# Patient Record
Sex: FEMALE | Race: WHITE | NOT HISPANIC OR LATINO | Employment: UNEMPLOYED | URBAN - METROPOLITAN AREA
[De-identification: names, ages, dates, MRNs, and addresses within clinical notes are randomized per-mention and may not be internally consistent; named-entity substitution may affect disease eponyms.]

---

## 2018-09-22 ENCOUNTER — HOSPITAL ENCOUNTER (EMERGENCY)
Facility: HOSPITAL | Age: 38
Discharge: HOME | End: 2018-09-22
Attending: EMERGENCY MEDICINE
Payer: MEDICAID

## 2018-09-22 VITALS
SYSTOLIC BLOOD PRESSURE: 120 MMHG | BODY MASS INDEX: 21.19 KG/M2 | DIASTOLIC BLOOD PRESSURE: 69 MMHG | TEMPERATURE: 98 F | HEIGHT: 67 IN | OXYGEN SATURATION: 97 % | HEART RATE: 86 BPM | WEIGHT: 135 LBS | RESPIRATION RATE: 20 BRPM

## 2018-09-22 DIAGNOSIS — R10.9 ABDOMINAL PAIN: ICD-10-CM

## 2018-09-22 DIAGNOSIS — R07.81 RIB PAIN ON RIGHT SIDE: Primary | ICD-10-CM

## 2018-09-22 LAB
ALBUMIN SERPL BCP-MCNC: 3.4 G/DL
ALP SERPL-CCNC: 101 U/L
ALT SERPL W/O P-5'-P-CCNC: 25 U/L
ANION GAP SERPL CALC-SCNC: 8 MMOL/L
AST SERPL-CCNC: 20 U/L
B-HCG UR QL: NEGATIVE
BASOPHILS # BLD AUTO: 0.01 K/UL
BASOPHILS NFR BLD: 0.2 %
BILIRUB SERPL-MCNC: 0.4 MG/DL
BILIRUB UR QL STRIP: NEGATIVE
BUN SERPL-MCNC: 15 MG/DL
CALCIUM SERPL-MCNC: 9.4 MG/DL
CHLORIDE SERPL-SCNC: 101 MMOL/L
CLARITY UR: ABNORMAL
CO2 SERPL-SCNC: 30 MMOL/L
COLOR UR: YELLOW
CREAT SERPL-MCNC: 0.7 MG/DL
CTP QC/QA: YES
DIFFERENTIAL METHOD: ABNORMAL
EOSINOPHIL # BLD AUTO: 0.2 K/UL
EOSINOPHIL NFR BLD: 3.4 %
ERYTHROCYTE [DISTWIDTH] IN BLOOD BY AUTOMATED COUNT: 12.5 %
EST. GFR  (AFRICAN AMERICAN): >60 ML/MIN/1.73 M^2
EST. GFR  (NON AFRICAN AMERICAN): >60 ML/MIN/1.73 M^2
GLUCOSE SERPL-MCNC: 94 MG/DL
GLUCOSE UR QL STRIP: NEGATIVE
HCT VFR BLD AUTO: 36.3 %
HGB BLD-MCNC: 12 G/DL
HGB UR QL STRIP: NEGATIVE
KETONES UR QL STRIP: NEGATIVE
LEUKOCYTE ESTERASE UR QL STRIP: NEGATIVE
LIPASE SERPL-CCNC: 6 U/L
LYMPHOCYTES # BLD AUTO: 1.3 K/UL
LYMPHOCYTES NFR BLD: 21.7 %
MCH RBC QN AUTO: 29.5 PG
MCHC RBC AUTO-ENTMCNC: 33.1 G/DL
MCV RBC AUTO: 89 FL
MONOCYTES # BLD AUTO: 0.6 K/UL
MONOCYTES NFR BLD: 10.3 %
NEUTROPHILS # BLD AUTO: 3.8 K/UL
NEUTROPHILS NFR BLD: 64.4 %
NITRITE UR QL STRIP: NEGATIVE
PH UR STRIP: 8 [PH] (ref 5–8)
PLATELET # BLD AUTO: 227 K/UL
PMV BLD AUTO: 10 FL
POTASSIUM SERPL-SCNC: 3.5 MMOL/L
PROT SERPL-MCNC: 7.2 G/DL
PROT UR QL STRIP: NEGATIVE
RBC # BLD AUTO: 4.07 M/UL
SODIUM SERPL-SCNC: 139 MMOL/L
SP GR UR STRIP: 1.01 (ref 1–1.03)
URN SPEC COLLECT METH UR: ABNORMAL
UROBILINOGEN UR STRIP-ACNC: ABNORMAL EU/DL
WBC # BLD AUTO: 5.9 K/UL

## 2018-09-22 PROCEDURE — 63600175 PHARM REV CODE 636 W HCPCS: Performed by: EMERGENCY MEDICINE

## 2018-09-22 PROCEDURE — 81003 URINALYSIS AUTO W/O SCOPE: CPT

## 2018-09-22 PROCEDURE — 93005 ELECTROCARDIOGRAM TRACING: CPT

## 2018-09-22 PROCEDURE — 81025 URINE PREGNANCY TEST: CPT | Performed by: EMERGENCY MEDICINE

## 2018-09-22 PROCEDURE — 83690 ASSAY OF LIPASE: CPT

## 2018-09-22 PROCEDURE — 85025 COMPLETE CBC W/AUTO DIFF WBC: CPT

## 2018-09-22 PROCEDURE — 99284 EMERGENCY DEPT VISIT MOD MDM: CPT | Mod: 25

## 2018-09-22 PROCEDURE — 80053 COMPREHEN METABOLIC PANEL: CPT

## 2018-09-22 PROCEDURE — 96372 THER/PROPH/DIAG INJ SC/IM: CPT

## 2018-09-22 RX ORDER — ACETAMINOPHEN 500 MG
500 TABLET ORAL EVERY 6 HOURS PRN
Qty: 25 TABLET | Refills: 0 | Status: SHIPPED | OUTPATIENT
Start: 2018-09-22 | End: 2020-02-19

## 2018-09-22 RX ORDER — KETOROLAC TROMETHAMINE 30 MG/ML
30 INJECTION, SOLUTION INTRAMUSCULAR; INTRAVENOUS
Status: COMPLETED | OUTPATIENT
Start: 2018-09-22 | End: 2018-09-22

## 2018-09-22 RX ORDER — IBUPROFEN 600 MG/1
600 TABLET ORAL EVERY 6 HOURS PRN
Qty: 25 TABLET | Refills: 0 | Status: SHIPPED | OUTPATIENT
Start: 2018-09-22 | End: 2020-02-19

## 2018-09-22 RX ORDER — KETOROLAC TROMETHAMINE 30 MG/ML
15 INJECTION, SOLUTION INTRAMUSCULAR; INTRAVENOUS
Status: DISCONTINUED | OUTPATIENT
Start: 2018-09-22 | End: 2018-09-22

## 2018-09-22 RX ADMIN — KETOROLAC TROMETHAMINE 30 MG: 30 INJECTION, SOLUTION INTRAMUSCULAR at 02:09

## 2018-09-22 NOTE — ED NOTES
Physician at bedside. Pt. Reports rt. Lower anterior rib pain with increasing sensitivity to palpation and pain with respirations x5 days. Pain radiates around to mid back area and describes as dull, aching. Pt. Has had cough and mild uri symptoms. Denies n/v.. Appetite wnl. Denies trauma or known injury. Pain is worse with lying down.

## 2018-09-22 NOTE — ED NOTES
Pt. Is sleeping without distress and is tolerating water well. md at bedside for reassessment and to discuss test results and discharge instructions

## 2018-09-22 NOTE — ED PROVIDER NOTES
Encounter Date: 9/22/2018    SCRIBE #1 NOTE: I, Abisai Holcomb, am scribing for, and in the presence of,  Dr. Milo Morales. I have scribed the entire note.       History     Chief Complaint   Patient presents with    Abdominal Pain     Patient reports of having right sided abdominal and rib pain x 5 days. Denies any nausea, vomiting, or diarrhea.     Rib Pain     Char Camargo is a 38 y.o. female who  has a past medical history of ADHD (attention deficit hyperactivity disorder), Cervical dysplasia, and Sinusitis.    The patient presents to the ED due to right sided abd pain that began more than 5 days ago. Patient reports she is unsure if she pulled a muscle but notes that it has been worsening. She endorses to right rib cage swelling, spasm, and hardness. She reports a stabbing pain that radiates to her back which she describes as aching and is aggravated with palpation and deep breathing. No reported alleviating factors. She admits to cough and runny nose but denies any nausea, vomiting, chest pain, SOB, diarrhea, black/bloody stool, or dysuria. She denies history of blood clots, hormone therapy, or recent traveling.       The history is provided by the patient.     Review of patient's allergies indicates:  No Known Allergies  Past Medical History:   Diagnosis Date    ADHD (attention deficit hyperactivity disorder)     Cervical dysplasia     Sinusitis      No past surgical history on file.  Family History   Problem Relation Age of Onset    Hypertension Mother     Skin cancer Father     Depression Father     Diabetes Father     Colon cancer Maternal Uncle     Breast cancer Paternal Grandmother     Breast cancer Maternal Aunt      Social History     Tobacco Use    Smoking status: Current Every Day Smoker     Packs/day: 0.50   Substance Use Topics    Alcohol use: No    Drug use: No     Review of Systems   Constitutional: Negative for chills and fever.   HENT: Positive for rhinorrhea. Negative for  congestion and sore throat.    Eyes: Negative for redness and visual disturbance.   Respiratory: Positive for cough. Negative for shortness of breath and wheezing.    Cardiovascular: Negative for chest pain and palpitations.   Gastrointestinal: Positive for abdominal pain. Negative for blood in stool, diarrhea, nausea and vomiting.   Genitourinary: Negative for dysuria and hematuria.   Musculoskeletal: Negative for back pain, myalgias and neck pain.   Skin: Negative for rash.   Neurological: Negative for dizziness, weakness and light-headedness.   Psychiatric/Behavioral: Negative for confusion.   All other systems reviewed and are negative.      Physical Exam     Initial Vitals [09/22/18 0106]   BP Pulse Resp Temp SpO2   (!) 153/98 101 20 98.4 °F (36.9 °C) 98 %      MAP       --         Physical Exam    Nursing note and vitals reviewed.  Constitutional: She appears well-developed and well-nourished. No distress.   HENT:   Head: Normocephalic and atraumatic.   Mouth/Throat: Oropharynx is clear and moist.   Eyes: Conjunctivae and EOM are normal. Pupils are equal, round, and reactive to light.   Neck: Normal range of motion. Neck supple.   Cardiovascular: Normal rate, regular rhythm, normal heart sounds and intact distal pulses.   Pulmonary/Chest: Breath sounds normal. No respiratory distress. She has no wheezes.   Abdominal: Soft. She exhibits no distension. There is tenderness.   RUQ tenderness to palpation.   Slight fullness along right upper rib cage.   Musculoskeletal: Normal range of motion. She exhibits no edema or tenderness.   Track marks noted to upper extremity.  Pelvis stable.   Neurological: She is alert and oriented to person, place, and time. She has normal strength. No cranial nerve deficit. GCS score is 15. GCS eye subscore is 4. GCS verbal subscore is 5. GCS motor subscore is 6.   Skin: Skin is warm and dry.         ED Course   Procedures  Labs Reviewed   CBC W/ AUTO DIFFERENTIAL - Abnormal; Notable  for the following components:       Result Value    Hematocrit 36.3 (*)     All other components within normal limits   COMPREHENSIVE METABOLIC PANEL - Abnormal; Notable for the following components:    CO2 30 (*)     Albumin 3.4 (*)     All other components within normal limits   URINALYSIS, REFLEX TO URINE CULTURE - Abnormal; Notable for the following components:    Appearance, UA Hazy (*)     Urobilinogen, UA 4.0-6.0 (*)     All other components within normal limits    Narrative:     Preferred Collection Type->Urine, Clean Catch   LIPASE   POCT URINE PREGNANCY     EKG Readings: (Independently Interpreted)   EKG: Rate: 93 bpm. Normal sinus rhythm. Negative QRS in lead 3. No ST changes.       Imaging Results          US Abdomen Limited (In process)                X-Ray Chest PA And Lateral (Final result)  Result time 09/22/18 02:05:28    Final result by Gregory Potter MD (09/22/18 02:05:28)                 Impression:      No acute process.      Electronically signed by: Gregory Potter MD  Date:    09/22/2018  Time:    02:05             Narrative:    EXAMINATION:  XR CHEST PA AND LATERAL    CLINICAL HISTORY:  Unspecified abdominal pain    TECHNIQUE:  PA and lateral views of the chest were performed.    COMPARISON:  None    FINDINGS:  The trachea is unremarkable.  The cardiomediastinal silhouette is within normal limits.  The hemidiaphragms are unremarkable.  There is no evidence of free air beneath the hemidiaphragms.  There are no pleural effusions.  There is no evidence of a pneumothorax.  There is no evidence of pneumomediastinum.  No airspace opacities are present.  The osseous structures are unremarkable.  The subcutaneous tissues are within normal limits.  There is large colonic stool burden.                                 Medical Decision Making:   Differential Diagnosis:   Differential Diagnosis includes, but is not limited to:  AAA, aortic dissection, mesenteric ischemia, perforated viscous, MI/ACS,  SBO/volvulus, incarcerated/strangulated hernia, intussusception, ileus, appendicitis, cholecystitis, cholangitis, diverticulitis, esophagitis, hepatitis, nephrolithiasis, pancreatitis, gastroenteritis, colitis, IBD/IBS, biliary colic, GERD, PUD, constipation, UTI/pyelonephritis,  disorder.    Clinical Tests:   Lab Tests: Ordered and Reviewed  Radiological Study: Ordered and Reviewed  Medical Tests: Ordered and Reviewed  ED Management:  2:47 AM   Patient was reassessed and in no apparent distress sleeping comfortably discussed with patient results of workup instructed to return with worsening of pain, fever, diarrhea, vomiting, or any other concern.  Chest x-ray negative for fracture ultrasound negative for acute cholecystitis urine without hematuria notable for elevated urine bilirubin however CMP unremarkable CBC also unremarkable will discharge patient with symptomatic care suspect possible muscle spasm    After complete evaluation, including thorough history and physical exam, the patient's symptoms are most consistent with benign cause of abdominal pain. There is no rebound/guarding or other peritoneal signs to suggest perforation or other emergent surgical process. There is no fever or leukocytosis to suggest acute bacterial infection. There is no significant focal abdominal tenderness to suggest cholecystitis, appendicitis, diverticulitis, or  source, and the patient's current symptoms and clinical presentation do not warrant other targeted diagnostics at this time. CT A/P are unlikely to outweigh risks of contrast/radiation at this time. The patient was treated with supportive care  and improved. Will provide RX for acetaminophen/ibuprofen upon D/C.                        Clinical Impression:     1. Abdominal pain    2. Abdominal pain            Disposition:   Disposition: Discharged  Condition: Stable       I, Milo Morales,  personally performed the services described in this documentation. All medical  record entries made by the scribe were at my direction and in my presence.  I have reviewed the chart and agree that the record reflects my personal performance and is accurate and complete. Milo Morales M.D. 3:17 AM09/22/2018                 Milo Morales Jr., MD  09/22/18 0317

## 2020-02-19 ENCOUNTER — OFFICE VISIT (OUTPATIENT)
Dept: OBSTETRICS AND GYNECOLOGY | Facility: CLINIC | Age: 40
End: 2020-02-19
Payer: MEDICAID

## 2020-02-19 ENCOUNTER — LAB VISIT (OUTPATIENT)
Dept: LAB | Facility: HOSPITAL | Age: 40
End: 2020-02-19
Attending: FAMILY MEDICINE
Payer: MEDICAID

## 2020-02-19 VITALS
BODY MASS INDEX: 23.7 KG/M2 | WEIGHT: 151 LBS | HEIGHT: 67 IN | SYSTOLIC BLOOD PRESSURE: 110 MMHG | DIASTOLIC BLOOD PRESSURE: 82 MMHG

## 2020-02-19 DIAGNOSIS — Z32.01 POSITIVE PREGNANCY TEST: Primary | ICD-10-CM

## 2020-02-19 DIAGNOSIS — Z32.01 POSITIVE PREGNANCY TEST: ICD-10-CM

## 2020-02-19 LAB — HCG INTACT+B SERPL-ACNC: 703 MIU/ML

## 2020-02-19 PROCEDURE — 99203 PR OFFICE/OUTPT VISIT, NEW, LEVL III, 30-44 MIN: ICD-10-PCS | Mod: TH,S$PBB,, | Performed by: MIDWIFE

## 2020-02-19 PROCEDURE — 84702 CHORIONIC GONADOTROPIN TEST: CPT

## 2020-02-19 PROCEDURE — 99999 PR PBB SHADOW E&M-EST. PATIENT-LVL II: ICD-10-PCS | Mod: PBBFAC,,, | Performed by: MIDWIFE

## 2020-02-19 PROCEDURE — 99203 OFFICE O/P NEW LOW 30 MIN: CPT | Mod: TH,S$PBB,, | Performed by: MIDWIFE

## 2020-02-19 PROCEDURE — 99999 PR PBB SHADOW E&M-EST. PATIENT-LVL II: CPT | Mod: PBBFAC,,, | Performed by: MIDWIFE

## 2020-02-19 PROCEDURE — 36415 COLL VENOUS BLD VENIPUNCTURE: CPT

## 2020-02-19 PROCEDURE — 99212 OFFICE O/P EST SF 10 MIN: CPT | Mod: PBBFAC,TH | Performed by: MIDWIFE

## 2020-02-19 RX ORDER — GABAPENTIN 600 MG/1
TABLET ORAL
COMMUNITY

## 2020-02-19 NOTE — PROGRESS NOTES
CHIEF COMPLAINT:   Patient presents with      Possible Pregnancy        HISTORY OF PRESENT ILLNESS  Char Camargo 39 y.o.  presents for pregnancy risk assessment.   The patient has no complaints today.  No nausea or vomiting. No bleeding or pain.  Pregnancy was not planned and is undesired.  Partner is unaware of pregnancy. Pt reports she is no longer in a relationship with the father of this pregnancy. Pt is considering elective . Supportive friend with her today. Pt has 15 y.o. Daughter and was not planning another pregnancy. Was not taking any form of birth control at the time of conception. Pt reports that she is Jain Taoism and doesn't believe in  but not sure what she wants to do at this point. Periods are very irregular and she does not remember when her last period occurred. Wanting to know how far along she is today.     OB history:      LMP: No LMP recorded (lmp unknown). Unsure of last period. Believes it could have been towards end of January.   EDC: Estimated Date of Delivery: None noted.  EGA: Unknown       Health Maintenance   Topic Date Due    Lipid Panel  1981    TETANUS VACCINE  1999    Pneumococcal Vaccine (Medium Risk) (1 of 1 - PPSV23) 2000    Pap Smear with HPV Cotest  05/15/2018       Past Medical History:   Diagnosis Date    ADHD (attention deficit hyperactivity disorder)     Cervical dysplasia     Sinusitis        History reviewed. No pertinent surgical history.    Family History   Problem Relation Age of Onset    Hypertension Mother     Skin cancer Father     Depression Father     Diabetes Father     Colon cancer Maternal Uncle     Breast cancer Paternal Grandmother     Breast cancer Maternal Aunt        Social History     Socioeconomic History    Marital status: Single     Spouse name: Not on file    Number of children: Not on file    Years of education: Not on file    Highest education level: Not on file    Occupational History     Employer: [employer]   Social Needs    Financial resource strain: Not on file    Food insecurity:     Worry: Not on file     Inability: Not on file    Transportation needs:     Medical: Not on file     Non-medical: Not on file   Tobacco Use    Smoking status: Current Every Day Smoker     Packs/day: 0.50     Types: Cigarettes   Substance and Sexual Activity    Alcohol use: No    Drug use: No    Sexual activity: Yes     Partners: Male     Birth control/protection: Other-see comments   Lifestyle    Physical activity:     Days per week: Not on file     Minutes per session: Not on file    Stress: Not on file   Relationships    Social connections:     Talks on phone: Not on file     Gets together: Not on file     Attends Congregational service: Not on file     Active member of club or organization: Not on file     Attends meetings of clubs or organizations: Not on file     Relationship status: Not on file   Other Topics Concern    Not on file   Social History Narrative    Not on file       Current Outpatient Medications   Medication Sig Dispense Refill    buPROPion (WELLBUTRIN SR) 100 MG TBSR 12 hr tablet Take 2 tablets (200 mg total) by mouth 2 (two) times daily. 120 tablet 2    fluoxetine 20 MG tablet Take 1 tablet (20 mg total) by mouth 2 (two) times daily. 60 tablet 5    gabapentin (NEURONTIN) 600 MG tablet gabapentin 600 mg tablet   TAKE 1 TABLET BY MOUTH TWICE A DAY       No current facility-administered medications for this visit.        Review of patient's allergies indicates:  No Known Allergies      PHYSICAL EXAM   Vitals:    02/19/20 1102   BP: 110/82        PAIN SCALE: 0/10 None    PHYSICAL EXAM    ROS:  GENERAL: No fever, chills, fatigability or weight loss.  CV: Denies chest pain  PULM: Denies shortness of breath or wheezing.  ABDOMEN: Appetite fine. No weight loss. Denies diarrhea, abdominal pain, hematemesis or blood in stool.  URINARY: No flank pain, dysuria or  "hematuria.  REPRODUCTIVE: No abnormal vaginal bleeding.       PE:   APPEARANCE: Well nourished, well developed, in no acute distress  ABDOMEN: Soft. No tenderness or masses. No hepatosplenomegaly. No hernias      UPT +    A/P:       - Hcg today  - Discussed with pt that [facility] does not participate in elective abortions. Discussed that patient had other options other than elective  including keeping the pregnancy and adoption. Referrals suggested including "[facility]" and "[facility]" discussed to assist pt in her decision making.   - Will f/u with serial Hcgs pending today's result        "

## 2020-02-20 ENCOUNTER — PATIENT MESSAGE (OUTPATIENT)
Dept: OBSTETRICS AND GYNECOLOGY | Facility: CLINIC | Age: 40
End: 2020-02-20

## 2020-02-20 DIAGNOSIS — Z32.01 POSITIVE BLOOD PREGNANCY TEST: Primary | ICD-10-CM

## 2020-02-24 ENCOUNTER — TELEPHONE (OUTPATIENT)
Dept: OBSTETRICS AND GYNECOLOGY | Facility: CLINIC | Age: 40
End: 2020-02-24

## 2020-02-24 NOTE — TELEPHONE ENCOUNTER
Spoke to patient. Patient asking for clearance letter stating that she is pregnant and clear to go to treatment center. Informed patient that Laura wanted her to come back this week for a repeat hcg level to verify a viable pregnancy. Patient stated that she is not able to come this week because she needs to go to the treatment center. Informed patient that we will not be able to write a clearance letter until she can complete the additional testing and follow-up. Patient verbalized understanding and ended call.

## 2020-02-24 NOTE — TELEPHONE ENCOUNTER
Patient called back, assisted patient in scheduling appointment for repeat hcg on Wednesday. Patient verbalized understanding.

## 2020-02-24 NOTE — TELEPHONE ENCOUNTER
----- Message from Raine Lindsay sent at 2/24/2020  1:29 PM CST -----  Contact: pt  Pt needs a clearance letter stating she can go to treatment.

## 2020-02-26 ENCOUNTER — LAB VISIT (OUTPATIENT)
Dept: LAB | Facility: HOSPITAL | Age: 40
End: 2020-02-26
Attending: MIDWIFE
Payer: MEDICAID

## 2020-02-26 DIAGNOSIS — Z32.01 POSITIVE BLOOD PREGNANCY TEST: ICD-10-CM

## 2020-02-26 LAB — HCG INTACT+B SERPL-ACNC: 8618 MIU/ML

## 2020-02-26 PROCEDURE — 36415 COLL VENOUS BLD VENIPUNCTURE: CPT

## 2020-02-26 PROCEDURE — 84702 CHORIONIC GONADOTROPIN TEST: CPT

## 2020-02-27 ENCOUNTER — TELEPHONE (OUTPATIENT)
Dept: OBSTETRICS AND GYNECOLOGY | Facility: CLINIC | Age: 40
End: 2020-02-27

## 2020-02-27 NOTE — TELEPHONE ENCOUNTER
----- Message from Agnieszka Barnes sent at 2/27/2020 11:32 AM CST -----  Contact: self  please fax her records to [facility], attn: oxana at [phone]. needs to be done asap. please call when done...[phone] (spoke w/ nurse earlier today regarding this)

## 2020-02-27 NOTE — TELEPHONE ENCOUNTER
I called the person and told her Ms Sanchez was out and introduced myself as Ms Treva one of the other midwives and asked her what exactly she needed and phone disconnected.  I immediately called back and went straight to  that has not yet been set up ???? Just let me know if you hear from her again.

## 2020-02-27 NOTE — TELEPHONE ENCOUNTER
----- Message from Marcelina Duncan sent at 2/27/2020  9:47 AM CST -----  Contact: pt  Pt is calling back to get lab results form 2/26/20 and can be reached at [phone]      Thanks,  Marcelina Duncan

## 2020-02-27 NOTE — TELEPHONE ENCOUNTER
"Patient calling for medical clearance to get in to a substance abuse treatment center today.  Patient stated she spoke to Laura Valero about this at her appointment. Patient [facility] which [facility].    Patient needs call back as soon as possible, stating [facility] has a bed waiting for her.  Asked patient if she had a fax number to send the letter and she stated "I will get one".  Will forward message to midwives in clinic today for assistance.  Spoke to GILMA Jones.  Sending to her.  "

## 2020-02-29 ENCOUNTER — PATIENT MESSAGE (OUTPATIENT)
Dept: OBSTETRICS AND GYNECOLOGY | Facility: HOSPITAL | Age: 40
End: 2020-02-29

## 2020-02-29 DIAGNOSIS — Z32.01 POSITIVE BLOOD PREGNANCY TEST: Primary | ICD-10-CM

## 2020-03-02 ENCOUNTER — TELEPHONE (OUTPATIENT)
Dept: OBSTETRICS AND GYNECOLOGY | Facility: CLINIC | Age: 40
End: 2020-03-02

## 2020-03-02 NOTE — TELEPHONE ENCOUNTER
Spoke to patient. Assisted patient in scheduling u/s and new ob appointment for Monday, 3/9/20. Patient verbalized understanding.

## 2020-03-02 NOTE — TELEPHONE ENCOUNTER
----- Message from Cookie Forde sent at 3/2/2020 11:29 AM CST -----  Contact: self [phone]  States that she is calling to schedule her US. Please call back at [phone]//thank you acc

## 2020-03-09 ENCOUNTER — INITIAL PRENATAL (OUTPATIENT)
Dept: OBSTETRICS AND GYNECOLOGY | Facility: CLINIC | Age: 40
End: 2020-03-09
Payer: MEDICAID

## 2020-03-09 ENCOUNTER — LAB VISIT (OUTPATIENT)
Dept: LAB | Facility: HOSPITAL | Age: 40
End: 2020-03-09
Attending: FAMILY MEDICINE
Payer: MEDICAID

## 2020-03-09 ENCOUNTER — PROCEDURE VISIT (OUTPATIENT)
Dept: OBSTETRICS AND GYNECOLOGY | Facility: CLINIC | Age: 40
End: 2020-03-09
Payer: MEDICAID

## 2020-03-09 VITALS
WEIGHT: 151.44 LBS | DIASTOLIC BLOOD PRESSURE: 86 MMHG | BODY MASS INDEX: 23.72 KG/M2 | SYSTOLIC BLOOD PRESSURE: 140 MMHG

## 2020-03-09 DIAGNOSIS — Z32.01 POSITIVE PREGNANCY TEST: ICD-10-CM

## 2020-03-09 DIAGNOSIS — Z32.01 POSITIVE BLOOD PREGNANCY TEST: ICD-10-CM

## 2020-03-09 DIAGNOSIS — Z32.01 POSITIVE BLOOD PREGNANCY TEST: Primary | ICD-10-CM

## 2020-03-09 PROCEDURE — 99211 OFF/OP EST MAY X REQ PHY/QHP: CPT | Mod: PBBFAC,25,TH | Performed by: MIDWIFE

## 2020-03-09 PROCEDURE — 76801 PR US, OB <14WKS, TRANSABD, SINGLE GESTATION: ICD-10-PCS | Mod: 26,S$PBB,, | Performed by: OBSTETRICS & GYNECOLOGY

## 2020-03-09 PROCEDURE — 76801 OB US < 14 WKS SINGLE FETUS: CPT | Mod: PBBFAC | Performed by: OBSTETRICS & GYNECOLOGY

## 2020-03-09 PROCEDURE — 99999 PR PBB SHADOW E&M-EST. PATIENT-LVL I: ICD-10-PCS | Mod: PBBFAC,,, | Performed by: MIDWIFE

## 2020-03-09 PROCEDURE — 99999 PR PBB SHADOW E&M-EST. PATIENT-LVL I: CPT | Mod: PBBFAC,,, | Performed by: MIDWIFE

## 2020-03-09 PROCEDURE — 99213 OFFICE O/P EST LOW 20 MIN: CPT | Mod: TH,S$PBB,, | Performed by: MIDWIFE

## 2020-03-09 PROCEDURE — 76801 OB US < 14 WKS SINGLE FETUS: CPT | Mod: 26,S$PBB,, | Performed by: OBSTETRICS & GYNECOLOGY

## 2020-03-09 PROCEDURE — 84702 CHORIONIC GONADOTROPIN TEST: CPT

## 2020-03-09 PROCEDURE — 36415 COLL VENOUS BLD VENIPUNCTURE: CPT

## 2020-03-09 PROCEDURE — 99213 PR OFFICE/OUTPT VISIT, EST, LEVL III, 20-29 MIN: ICD-10-PCS | Mod: TH,S$PBB,, | Performed by: MIDWIFE

## 2020-03-10 ENCOUNTER — TELEPHONE (OUTPATIENT)
Dept: OBSTETRICS AND GYNECOLOGY | Facility: CLINIC | Age: 40
End: 2020-03-10

## 2020-03-10 LAB — HCG INTACT+B SERPL-ACNC: NORMAL MIU/ML

## 2020-03-10 NOTE — TELEPHONE ENCOUNTER
Spoke with pt about Hcg results. Continue expectant management at this time. F/u US scheduled for Monday.

## 2020-03-10 NOTE — PROGRESS NOTES
39 y.o. female  at 7w0d   Denies VB, LOF or cramping  Doing well without concerns. No complaints.   Dating US today reveals very early IUP vs blighted ovum. Pt unsure of LMP. GS and YS noted. No fetal pole seen. GS measuring 6w4d. Reviewed US with patient and spoke about expectant management at this time. Will do HCG today and repeat US based on today's HCG.   Discussed s/s of miscarriage and to report to clinic if she began having cramping and bleeding.  Reviewed warning signs, normal FM, pregnancy precautions and how/when to call.  RTC x 1 wk, call or present sooner prn.

## 2020-03-16 ENCOUNTER — OFFICE VISIT (OUTPATIENT)
Dept: OBSTETRICS AND GYNECOLOGY | Facility: CLINIC | Age: 40
End: 2020-03-16
Payer: MEDICAID

## 2020-03-16 ENCOUNTER — LAB VISIT (OUTPATIENT)
Dept: LAB | Facility: HOSPITAL | Age: 40
End: 2020-03-16
Attending: FAMILY MEDICINE
Payer: MEDICAID

## 2020-03-16 ENCOUNTER — PROCEDURE VISIT (OUTPATIENT)
Dept: OBSTETRICS AND GYNECOLOGY | Facility: CLINIC | Age: 40
End: 2020-03-16
Payer: MEDICAID

## 2020-03-16 VITALS — HEIGHT: 67 IN | SYSTOLIC BLOOD PRESSURE: 122 MMHG | BODY MASS INDEX: 23.72 KG/M2 | DIASTOLIC BLOOD PRESSURE: 68 MMHG

## 2020-03-16 DIAGNOSIS — O26.899 RH NEGATIVE STATE IN ANTEPARTUM PERIOD: ICD-10-CM

## 2020-03-16 DIAGNOSIS — Z67.91 RH NEGATIVE STATE IN ANTEPARTUM PERIOD: ICD-10-CM

## 2020-03-16 DIAGNOSIS — O02.0 BLIGHTED OVUM: Primary | ICD-10-CM

## 2020-03-16 DIAGNOSIS — Z32.01 POSITIVE BLOOD PREGNANCY TEST: ICD-10-CM

## 2020-03-16 DIAGNOSIS — O02.0 BLIGHTED OVUM: ICD-10-CM

## 2020-03-16 LAB
ABO + RH BLD: NORMAL
BLD GP AB SCN CELLS X3 SERPL QL: NORMAL
HCG INTACT+B SERPL-ACNC: NORMAL MIU/ML

## 2020-03-16 PROCEDURE — 99999 PR PBB SHADOW E&M-EST. PATIENT-LVL II: ICD-10-PCS | Mod: PBBFAC,,, | Performed by: MIDWIFE

## 2020-03-16 PROCEDURE — 99999 PR PBB SHADOW E&M-EST. PATIENT-LVL II: CPT | Mod: PBBFAC,,, | Performed by: MIDWIFE

## 2020-03-16 PROCEDURE — 76801 PR US, OB <14WKS, TRANSABD, SINGLE GESTATION: ICD-10-PCS | Mod: 26,S$PBB,, | Performed by: OBSTETRICS & GYNECOLOGY

## 2020-03-16 PROCEDURE — 84702 CHORIONIC GONADOTROPIN TEST: CPT

## 2020-03-16 PROCEDURE — 86900 BLOOD TYPING SEROLOGIC ABO: CPT

## 2020-03-16 PROCEDURE — 76801 OB US < 14 WKS SINGLE FETUS: CPT | Mod: 26,S$PBB,, | Performed by: OBSTETRICS & GYNECOLOGY

## 2020-03-16 PROCEDURE — 99212 OFFICE O/P EST SF 10 MIN: CPT | Mod: PBBFAC,25,TH | Performed by: MIDWIFE

## 2020-03-16 PROCEDURE — 76801 OB US < 14 WKS SINGLE FETUS: CPT | Mod: PBBFAC | Performed by: OBSTETRICS & GYNECOLOGY

## 2020-03-16 PROCEDURE — 99213 PR OFFICE/OUTPT VISIT, EST, LEVL III, 20-29 MIN: ICD-10-PCS | Mod: TH,S$PBB,, | Performed by: MIDWIFE

## 2020-03-16 PROCEDURE — 36415 COLL VENOUS BLD VENIPUNCTURE: CPT

## 2020-03-16 PROCEDURE — 99213 OFFICE O/P EST LOW 20 MIN: CPT | Mod: TH,S$PBB,, | Performed by: MIDWIFE

## 2020-03-16 PROCEDURE — 86901 BLOOD TYPING SEROLOGIC RH(D): CPT

## 2020-03-16 RX ORDER — MISOPROSTOL 200 UG/1
800 TABLET ORAL DAILY
Qty: 8 TABLET | Refills: 0 | Status: SHIPPED | OUTPATIENT
Start: 2020-03-16 | End: 2020-03-18

## 2020-03-16 RX ORDER — NAPROXEN 500 MG/1
500 TABLET ORAL 2 TIMES DAILY
Qty: 20 TABLET | Refills: 0 | Status: SHIPPED | OUTPATIENT
Start: 2020-03-16

## 2020-03-16 RX ORDER — ACYCLOVIR 800 MG/1
TABLET ORAL
COMMUNITY
Start: 2020-03-05

## 2020-03-17 LAB — RH BLD: NORMAL

## 2020-03-19 ENCOUNTER — DOCUMENTATION ONLY (OUTPATIENT)
Dept: OBSTETRICS AND GYNECOLOGY | Facility: CLINIC | Age: 40
End: 2020-03-19

## 2020-03-19 ENCOUNTER — TELEPHONE (OUTPATIENT)
Dept: OBSTETRICS AND GYNECOLOGY | Facility: CLINIC | Age: 40
End: 2020-03-19

## 2020-03-19 DIAGNOSIS — O26.899 RH NEGATIVE STATE IN ANTEPARTUM PERIOD: Primary | ICD-10-CM

## 2020-03-19 DIAGNOSIS — O02.0 BLIGHTED OVUM: ICD-10-CM

## 2020-03-19 DIAGNOSIS — Z67.91 RH NEGATIVE STATE IN ANTEPARTUM PERIOD: Primary | ICD-10-CM

## 2020-03-19 NOTE — PROGRESS NOTES
CHIEF COMPLAINT:   Patient presents with      Possible Pregnancy        HISTORY OF PRESENT ILLNESS  Char Camargo 39 y.o.  presents for pregnancy risk assessment follow up.   The patient has no complaints today.  No nausea or vomiting. No bleeding or pain. C/o breast tenderness.      LMP: Unknown    Health Maintenance   Topic Date Due    Lipid Panel  1981    TETANUS VACCINE  1999    Pneumococcal Vaccine (Medium Risk) (1 of 1 - PPSV23) 2000    Pap Smear with HPV Cotest  05/15/2018       Past Medical History:   Diagnosis Date    ADHD (attention deficit hyperactivity disorder)     Cervical dysplasia     Sinusitis        History reviewed. No pertinent surgical history.    Family History   Problem Relation Age of Onset    Hypertension Mother     Skin cancer Father     Depression Father     Diabetes Father     Colon cancer Maternal Uncle     Breast cancer Paternal Grandmother     Breast cancer Maternal Aunt        Social History     Socioeconomic History    Marital status: Single     Spouse name: Not on file    Number of children: Not on file    Years of education: Not on file    Highest education level: Not on file   Occupational History     Employer: [employer]   Social Needs    Financial resource strain: Not on file    Food insecurity:     Worry: Not on file     Inability: Not on file    Transportation needs:     Medical: Not on file     Non-medical: Not on file   Tobacco Use    Smoking status: Current Every Day Smoker     Packs/day: 0.50     Types: Cigarettes   Substance and Sexual Activity    Alcohol use: No    Drug use: No    Sexual activity: Yes     Partners: Male     Birth control/protection: Other-see comments   Lifestyle    Physical activity:     Days per week: Not on file     Minutes per session: Not on file    Stress: Not on file   Relationships    Social connections:     Talks on phone: Not on file     Gets together: Not on file     Attends  Mandaen service: Not on file     Active member of club or organization: Not on file     Attends meetings of clubs or organizations: Not on file     Relationship status: Not on file   Other Topics Concern    Not on file   Social History Narrative    Not on file       Current Outpatient Medications   Medication Sig Dispense Refill    acyclovir (ZOVIRAX) 800 MG Tab       buPROPion (WELLBUTRIN SR) 100 MG TBSR 12 hr tablet Take 2 tablets (200 mg total) by mouth 2 (two) times daily. 120 tablet 2    fluoxetine 20 MG tablet Take 1 tablet (20 mg total) by mouth 2 (two) times daily. 60 tablet 5    gabapentin (NEURONTIN) 600 MG tablet gabapentin 600 mg tablet   TAKE 1 TABLET BY MOUTH TWICE A DAY      miSOPROStoL (CYTOTEC) 200 MCG Tab Take 4 tablets (800 mcg total) by mouth once daily. If bleeding or cramping has not started after 24 hours from the first dose, proceed with the second dose. for 2 days 8 tablet 0    naproxen (EC NAPROSYN) 500 MG EC tablet Take 1 tablet (500 mg total) by mouth 2 (two) times daily. 20 tablet 0     No current facility-administered medications for this visit.        Review of patient's allergies indicates:  No Known Allergies      PHYSICAL EXAM   Vitals:    03/16/20 1623   BP: 122/68        PAIN SCALE: 0/10 None    PHYSICAL EXAM    ROS:  GENERAL: No fever, chills, fatigability or weight loss.  CV: Denies chest pain  PULM: Denies shortness of breath or wheezing.  ABDOMEN: Appetite fine. No weight loss. Denies diarrhea, abdominal pain, hematemesis or blood in stool.  URINARY: No flank pain, dysuria or hematuria.  REPRODUCTIVE: No abnormal vaginal bleeding.     Plan:    - F/U confirmation of IUP US today: mitchell gestational sac measuring 26.5mm x 34.5 mm x 24.5 mm, yolk sac visualized, amniotic sac not visualized, embryo not visualized, GS 7w6d, fetal pole not visualized.   - Discussed blighted ovum plan of care and active vs expectant management. Pt choosing active management at this  time.  - Cytotec sent to pharmacy, pt instructed on use and when to report to the ED.   - Hcg and Blood type and screen today.   - Serial Hcgs weekly until pre-pregnancy levels  - Rhogam administration if patient negative blood type  - will f/u with patient x 2 days.

## 2020-03-19 NOTE — TELEPHONE ENCOUNTER
03/19/20-Called patient for update since administration of Cytotec. Pt reports that she began bleeding yesterday. cramping is tolerable. Discussed wtih patient about being negative blood type and needing Rhogam injection. Pt states she will call back to schedule appointment for today or tomorrow depending on when she can get transportation.

## 2020-03-20 ENCOUNTER — TELEPHONE (OUTPATIENT)
Dept: OBSTETRICS AND GYNECOLOGY | Facility: CLINIC | Age: 40
End: 2020-03-20

## 2020-03-20 ENCOUNTER — CLINICAL SUPPORT (OUTPATIENT)
Dept: OBSTETRICS AND GYNECOLOGY | Facility: CLINIC | Age: 40
End: 2020-03-20
Payer: MEDICAID

## 2020-03-20 DIAGNOSIS — O26.899 RH NEGATIVE STATE IN ANTEPARTUM PERIOD: Primary | ICD-10-CM

## 2020-03-20 DIAGNOSIS — Z67.91 RH NEGATIVE STATE IN ANTEPARTUM PERIOD: Primary | ICD-10-CM

## 2020-03-20 PROCEDURE — 96372 THER/PROPH/DIAG INJ SC/IM: CPT | Mod: PBBFAC

## 2020-03-20 RX ADMIN — HUMAN RHO(D) IMMUNE GLOBULIN 300 MCG: 300 INJECTION, SOLUTION INTRAMUSCULAR at 02:03

## 2020-03-20 NOTE — TELEPHONE ENCOUNTER
----- Message from Laura Valero CNM sent at 3/19/2020  4:32 PM CDT -----  Please call patient and schedule her to come in for a nurse visit to get her Rhogam injection. Orders are in. Thank you!  Laura

## 2020-03-20 NOTE — TELEPHONE ENCOUNTER
Spoke to patient. Patient stated that she is having a hard time finding a ride and will try to find a ride and call back to schedule nurse visit.

## 2020-03-20 NOTE — PROGRESS NOTES
Pt arrived for Rho Gram inj. PT verified per 2 identifiers. Allergies review blood type reviewed. Injection given in  Lt  ventrogluteal area . Pt advised to remain in waiting area for 15 min , to ensure adverse reactions .  Maurizio THOMAS

## 2020-03-20 NOTE — TELEPHONE ENCOUNTER
Spoke to patient and assisted in scheduling her nurse visit for today 03/20/2020 at 1:30pm at the [place] location. Patient verbalized understanding.

## 2020-03-20 NOTE — TELEPHONE ENCOUNTER
----- Message from Lynne Carrasco sent at 3/20/2020 10:18 AM CDT -----  Contact: pt  Pt stated she has transportation and can come in at 1 or 2pm ... Call back : [phone] (home)

## 2020-03-31 ENCOUNTER — TELEPHONE (OUTPATIENT)
Dept: OBSTETRICS AND GYNECOLOGY | Facility: CLINIC | Age: 40
End: 2020-03-31

## 2020-03-31 NOTE — TELEPHONE ENCOUNTER
----- Message from Laura Valero CNM sent at 3/31/2020  8:35 AM CDT -----  Please call patient and see if she can come get an Hcg lab done. She should have a standing order. Her Hcg's will need to be followed until they are close to or less than 5. Thank you.   Laura

## 2020-04-08 ENCOUNTER — TELEPHONE (OUTPATIENT)
Dept: OBSTETRICS AND GYNECOLOGY | Facility: CLINIC | Age: 40
End: 2020-04-08

## 2020-04-08 NOTE — TELEPHONE ENCOUNTER
----- Message from Laura Valero CNM sent at 4/8/2020  3:11 PM CDT -----  Please call patient and see if she will come for an Hcg draw. She no showed her last apt. It is important that we follow these until they are back to pre-pregnancy levels.

## 2021-04-28 ENCOUNTER — PATIENT MESSAGE (OUTPATIENT)
Dept: RESEARCH | Facility: HOSPITAL | Age: 41
End: 2021-04-28

## 2024-01-14 ENCOUNTER — HOSPITAL ENCOUNTER (EMERGENCY)
Facility: OTHER | Age: 44
Discharge: HOME | End: 2024-01-14
Attending: EMERGENCY MEDICINE
Payer: MEDICAID

## 2024-01-14 VITALS
RESPIRATION RATE: 17 BRPM | TEMPERATURE: 99 F | SYSTOLIC BLOOD PRESSURE: 129 MMHG | DIASTOLIC BLOOD PRESSURE: 71 MMHG | HEART RATE: 90 BPM | OXYGEN SATURATION: 98 %

## 2024-01-14 DIAGNOSIS — F17.200 CURRENT EVERY DAY SMOKER: ICD-10-CM

## 2024-01-14 DIAGNOSIS — J18.9 COMMUNITY ACQUIRED PNEUMONIA OF LEFT LOWER LOBE OF LUNG: Primary | ICD-10-CM

## 2024-01-14 DIAGNOSIS — R05.9 COUGH: ICD-10-CM

## 2024-01-14 LAB
CTP QC/QA: YES
POC MOLECULAR INFLUENZA A AGN: NEGATIVE
POC MOLECULAR INFLUENZA B AGN: NEGATIVE
SARS-COV-2 RDRP RESP QL NAA+PROBE: NEGATIVE

## 2024-01-14 PROCEDURE — 99284 EMERGENCY DEPT VISIT MOD MDM: CPT | Mod: 25

## 2024-01-14 PROCEDURE — 25000003 PHARM REV CODE 250: Performed by: NURSE PRACTITIONER

## 2024-01-14 PROCEDURE — U0002 COVID-19 LAB TEST NON-CDC: HCPCS | Performed by: EMERGENCY MEDICINE

## 2024-01-14 PROCEDURE — 25000242 PHARM REV CODE 250 ALT 637 W/ HCPCS: Performed by: NURSE PRACTITIONER

## 2024-01-14 RX ORDER — ALBUTEROL SULFATE 90 UG/1
1-2 AEROSOL, METERED RESPIRATORY (INHALATION) EVERY 6 HOURS PRN
Qty: 8 G | Refills: 0 | Status: SHIPPED | OUTPATIENT
Start: 2024-01-14 | End: 2025-01-13

## 2024-01-14 RX ORDER — BENZONATATE 100 MG/1
100 CAPSULE ORAL ONCE
Status: COMPLETED | OUTPATIENT
Start: 2024-01-14 | End: 2024-01-14

## 2024-01-14 RX ORDER — ACETAMINOPHEN 500 MG
1000 TABLET ORAL EVERY 6 HOURS PRN
Qty: 20 TABLET | Refills: 0 | Status: SHIPPED | OUTPATIENT
Start: 2024-01-14

## 2024-01-14 RX ORDER — PROMETHAZINE HYDROCHLORIDE AND DEXTROMETHORPHAN HYDROBROMIDE 6.25; 15 MG/5ML; MG/5ML
5 SYRUP ORAL EVERY 6 HOURS PRN
Qty: 118 ML | Refills: 0 | Status: SHIPPED | OUTPATIENT
Start: 2024-01-14 | End: 2024-01-24

## 2024-01-14 RX ORDER — FLUTICASONE PROPIONATE 50 MCG
2 SPRAY, SUSPENSION (ML) NASAL ONCE
Status: COMPLETED | OUTPATIENT
Start: 2024-01-14 | End: 2024-01-14

## 2024-01-14 RX ORDER — GUAIFENESIN 600 MG/1
1200 TABLET, EXTENDED RELEASE ORAL 2 TIMES DAILY
Qty: 20 TABLET | Refills: 0 | Status: SHIPPED | OUTPATIENT
Start: 2024-01-14 | End: 2024-01-19

## 2024-01-14 RX ORDER — ACETAMINOPHEN 500 MG
1000 TABLET ORAL
Status: COMPLETED | OUTPATIENT
Start: 2024-01-14 | End: 2024-01-14

## 2024-01-14 RX ORDER — IBUPROFEN 600 MG/1
600 TABLET ORAL
Status: COMPLETED | OUTPATIENT
Start: 2024-01-14 | End: 2024-01-14

## 2024-01-14 RX ORDER — FLUTICASONE PROPIONATE 50 MCG
1 SPRAY, SUSPENSION (ML) NASAL 2 TIMES DAILY PRN
Qty: 15 G | Refills: 0 | Status: SHIPPED | OUTPATIENT
Start: 2024-01-14

## 2024-01-14 RX ORDER — BENZONATATE 100 MG/1
100 CAPSULE ORAL 3 TIMES DAILY PRN
Qty: 20 CAPSULE | Refills: 0 | Status: SHIPPED | OUTPATIENT
Start: 2024-01-14 | End: 2024-01-24

## 2024-01-14 RX ORDER — IBUPROFEN 600 MG/1
600 TABLET ORAL EVERY 6 HOURS PRN
Qty: 20 TABLET | Refills: 0 | Status: SHIPPED | OUTPATIENT
Start: 2024-01-14

## 2024-01-14 RX ORDER — DOXYCYCLINE 100 MG/1
100 CAPSULE ORAL 2 TIMES DAILY
Qty: 10 CAPSULE | Refills: 0 | Status: SHIPPED | OUTPATIENT
Start: 2024-01-14 | End: 2024-01-19

## 2024-01-14 RX ORDER — DOXYCYCLINE HYCLATE 100 MG
100 TABLET ORAL
Status: DISCONTINUED | OUTPATIENT
Start: 2024-01-14 | End: 2024-01-15 | Stop reason: HOSPADM

## 2024-01-14 RX ORDER — CYCLOBENZAPRINE HCL 10 MG
10 TABLET ORAL 3 TIMES DAILY PRN
Qty: 15 TABLET | Refills: 0 | Status: SHIPPED | OUTPATIENT
Start: 2024-01-14 | End: 2024-01-19

## 2024-01-14 RX ADMIN — ACETAMINOPHEN 1000 MG: 500 TABLET ORAL at 09:01

## 2024-01-14 RX ADMIN — BENZONATATE 100 MG: 100 CAPSULE ORAL at 10:01

## 2024-01-14 RX ADMIN — IBUPROFEN 600 MG: 600 TABLET, FILM COATED ORAL at 09:01

## 2024-01-14 RX ADMIN — FLUTICASONE PROPIONATE 100 MCG: 50 SPRAY, METERED NASAL at 09:01

## 2024-01-15 NOTE — ED PROVIDER NOTES
Source of History:  Patient    Chief complaint:  Nasal Congestion (Pt c/o cough and nasal congestion x3 weeks. Has not attempted anything at home for symptoms. )      HPI:  Char Camargo is a 43 y.o. female presenting with nasal congestion and productive cough for the past 3 weeks.  She reports dark yellow-green thick sputum that she coughs up from her chest and that comes out her nose and mouth.  She reports chest discomfort and shortness of breath only when coughing.  She reports fever and chills last night and generally feeling unwell.  No abdominal pain nausea or vomiting.  She has not tried any medications for her symptoms.    This is the extent to the patients complaints today here in the emergency department.    ROS: As per HPI     Review of patient's allergies indicates:  No Known Allergies    PMH:  As per HPI and below:  Past Medical History:   Diagnosis Date    ADHD (attention deficit hyperactivity disorder)     Cervical dysplasia     Hepatitis C     Heroin abuse     Heroin overdose     Sinusitis      No past surgical history on file.    Social History     Tobacco Use    Smoking status: Every Day     Current packs/day: 0.50     Types: Cigarettes   Substance Use Topics    Alcohol use: No    Drug use: No       Physical Exam:    /71 (BP Location: Left arm, Patient Position: Sitting)   Pulse 90   Temp 98.5 °F (36.9 °C) (Oral)   Resp 17   SpO2 98%   Nursing note and vital signs reviewed.  Appearance: No acute distress.  Eyes: No conjunctival injection.  ENT: Oropharynx clear.  No frontal or maxillary sinus tenderness.  No nasal drainage noted.  Chest/ Respiratory: Clear to auscultation bilaterally.  Decreased air movement noted on the left side.  No wheezes.  No rhonchi. No rales. No accessory muscle use.  Cardiovascular: Regular rate and rhythm.  No murmurs. No gallops. No rubs.  Abdomen: Soft.  Not distended.  Nontender.  No guarding.  No rebound. Non-peritoneal.  Musculoskeletal:  Good range of motion all joints.  No deformities.  Neck supple.  No meningismus.  Skin: No rashes seen.  Good turgor.  No abrasions.  No ecchymoses.  Neurologic: Motor intact.  Sensation intact.  Cerebellar intact.  Cranial nerves intact.  Mental Status:  Alert and oriented x 3.  Appropriate, conversant    Labs that have been ordered have been independently reviewed and interpreted by myself.        Labs Reviewed   SARS-COV-2 RNA AMPLIFICATION, QUAL   POCT INFLUENZA A/B MOLECULAR       Imaging Results              X-Ray Chest PA And Lateral (Final result)  Result time 01/14/24 22:29:12      Final result by Nilesh Adkins MD (01/14/24 22:29:12)                   Impression:      Abnormal airspace opacification and consolidation in the left lower lung zone which is most suggestive for pneumonia.  Future radiographic follow-up is recommended to ensure resolution.      Electronically signed by: Nilesh Adkins MD  Date:    01/14/2024  Time:    22:29               Narrative:    EXAMINATION:  XR CHEST PA AND LATERAL    CLINICAL HISTORY:  Cough, unspecified    TECHNIQUE:  PA and lateral views of the chest were performed.    COMPARISON:  September 2018.    FINDINGS:  Cardiac silhouette is normal in size.  Lungs are symmetrically expanded.  Abnormal airspace opacification and consolidation are seen in the left lower lung zone.  Right lung is relatively clear.  No pneumothorax.  No significant pleural effusion seen on lateral projection.  No acute osseous abnormality identified.                                      Initial Impression/ Differential Dx:  Urgent evaluation of 43 y.o. female presenting with cough and nasal congestion. Patient is afebrile, not toxic appearing and hemodynamically stable.  Patient initially mildly tachycardic on triage vitals but normal heart rate on my assessment, suspect tachycardia secondary to walking up the hill.  On exam, no frontal or maxillary sinus tenderness suggest acute bacterial  sinusitis.  Will screen for COVID, flu and obtain CXR.  Patient does have decreased breath sounds on the left side but no rales or rhonchi.  Patient is a current everyday smoker, thus pneumonias consideration.  Patient states that since she has been feeling ill, she has decreased the number of cigarettes she has been smoking.  Will give Tylenol, ibuprofen and Tessalon continue to reassess.  I do not think that lab work is indicated at this time.  Considered below suspicion for PE as on my assessment, can not PERC patient out    Differential Diagnosis includes, but is not limited to:  Viral URI, bronchitis, influenza, pertussis,  pneumonia, lung abscess, fungal infection, TB, epiglottitis.    MDM:        ED Course as of 01/14/24 2322   Sun Jan 14, 2024   2227 POC Molecular Influenza A Ag: Negative [CU]   2228 POC Molecular Influenza B Ag: Negative [CU]   2228 SARS-CoV-2 RNA, Amplification, Qual: Negative [CU]   2234 X-Ray Chest PA And Lateral  Abnormal airspace opacification and consolidation in the left lower lung zone which is most suggestive for pneumonia.  [CU]   2243 At bedside to reassess patient and inform her of lab and imaging results.  Will discharge patient home with supportive medications as well as antibiotics.  Counseled patient as much as she can to stop smoking or at least significantly reduce the number of cigarettes she is smoking as this can prolonged cough and pneumonia.  Patient does not have a PCP, instructed her to follow up with the [facility] center and placed referral for Internal Medicine as she will need a repeat x-ray to ensure resolution of pneumonia.  Patient is amenable to this plan and discharged home in good condition. Patient educated on signs and symptoms to monitor for and when to return to ED. Patient verbalized understanding agrees with treatment plan. All questions and concerns addressed.      [CU]      ED Course User Index  [CU] Nadja Chapman, NP                   Diagnostic  Impression:    1. Community acquired pneumonia of left lower lobe of lung    2. Cough    3. Current every day smoker         ED Disposition Condition    Discharge Good            ED Prescriptions       Medication Sig Dispense Start Date End Date Auth. Provider    acetaminophen (TYLENOL) 500 MG tablet Take 2 tablets (1,000 mg total) by mouth every 6 (six) hours as needed for Pain. 20 tablet 1/14/2024 -- Nadja Chapman, NP    ibuprofen (ADVIL,MOTRIN) 600 MG tablet Take 1 tablet (600 mg total) by mouth every 6 (six) hours as needed for Pain. 20 tablet 1/14/2024 -- UpholdNadja, NP    benzonatate (TESSALON) 100 MG capsule Take 1 capsule (100 mg total) by mouth 3 (three) times daily as needed for Cough. 20 capsule 1/14/2024 1/24/2024 UpNadja lowe, NP    promethazine-dextromethorphan (PROMETHAZINE-DM) 6.25-15 mg/5 mL Syrp Take 5 mLs by mouth every 6 (six) hours as needed (cough, especially a cough that keeps you up at night). 118 mL 1/14/2024 1/24/2024 Nadja Chapman, NP    albuterol (PROVENTIL/VENTOLIN HFA) 90 mcg/actuation inhaler Inhale 1-2 puffs into the lungs every 6 (six) hours as needed for Wheezing or Shortness of Breath. Rescue 8 g 1/14/2024 1/13/2025 Nadja Chapman NP    cyclobenzaprine (FLEXERIL) 10 MG tablet Take 1 tablet (10 mg total) by mouth 3 (three) times daily as needed for Muscle spasms. 15 tablet 1/14/2024 1/19/2024 Nadja Chapman, NP    fluticasone propionate (FLONASE) 50 mcg/actuation nasal spray 1 spray (50 mcg total) by Each Nostril route 2 (two) times daily as needed for Rhinitis. 15 g 1/14/2024 -- Nadja Chapman, NP    guaiFENesin (MUCINEX) 600 mg 12 hr tablet Take 2 tablets (1,200 mg total) by mouth 2 (two) times daily. for 5 days 20 tablet 1/14/2024 1/19/2024 Nadja Chapman, NP    doxycycline (VIBRAMYCIN) 100 MG Cap Take 1 capsule (100 mg total) by mouth 2 (two) times daily. for 5 days 10 capsule 1/14/2024 1/19/2024 Nadja Chapman, NP          Follow-up  Information       Follow up With Specialties Details Why Contact Info    [facility] ([address]  Schedule an appointment as soon as possible for a visit   [address], [place] [address]  [phone]             Nadja Chapman, NP  01/14/24 3355